# Patient Record
Sex: FEMALE | Race: WHITE | Employment: OTHER | URBAN - NONMETROPOLITAN AREA
[De-identification: names, ages, dates, MRNs, and addresses within clinical notes are randomized per-mention and may not be internally consistent; named-entity substitution may affect disease eponyms.]

---

## 2017-01-01 ENCOUNTER — OFFICE VISIT (OUTPATIENT)
Dept: NEPHROLOGY | Age: 82
End: 2017-01-01
Payer: MEDICARE

## 2017-01-01 VITALS
HEART RATE: 85 BPM | BODY MASS INDEX: 28.35 KG/M2 | WEIGHT: 160 LBS | DIASTOLIC BLOOD PRESSURE: 44 MMHG | OXYGEN SATURATION: 93 % | HEIGHT: 63 IN | RESPIRATION RATE: 18 BRPM | SYSTOLIC BLOOD PRESSURE: 94 MMHG

## 2017-01-01 DIAGNOSIS — N18.30 CKD (CHRONIC KIDNEY DISEASE), STAGE III (HCC): Primary | ICD-10-CM

## 2017-01-01 LAB
BASOPHILS ABSOLUTE: NORMAL /ΜL
BASOPHILS RELATIVE PERCENT: NORMAL %
BUN BLDV-MCNC: 36 MG/DL
CALCIUM SERPL-MCNC: 9.1 MG/DL
CHLORIDE BLD-SCNC: 102 MMOL/L
CO2: 25 MMOL/L
CREAT SERPL-MCNC: 1.5 MG/DL
EOSINOPHILS ABSOLUTE: NORMAL /ΜL
EOSINOPHILS RELATIVE PERCENT: NORMAL %
GFR CALCULATED: 35
GLUCOSE BLD-MCNC: 163 MG/DL
HCT VFR BLD CALC: 40.4 % (ref 36–46)
HEMOGLOBIN: 12.8 G/DL (ref 12–16)
LYMPHOCYTES ABSOLUTE: NORMAL /ΜL
LYMPHOCYTES RELATIVE PERCENT: NORMAL %
MCH RBC QN AUTO: NORMAL PG
MCHC RBC AUTO-ENTMCNC: NORMAL G/DL
MCV RBC AUTO: NORMAL FL
MONOCYTES ABSOLUTE: NORMAL /ΜL
MONOCYTES RELATIVE PERCENT: NORMAL %
NEUTROPHILS ABSOLUTE: NORMAL /ΜL
NEUTROPHILS RELATIVE PERCENT: NORMAL %
PLATELET # BLD: 213 K/ΜL
PMV BLD AUTO: NORMAL FL
POTASSIUM SERPL-SCNC: 5.1 MMOL/L
RBC # BLD: 4.36 10^6/ΜL
SODIUM BLD-SCNC: 138 MMOL/L
WBC # BLD: 6.64 10^3/ML

## 2017-01-01 PROCEDURE — G8419 CALC BMI OUT NRM PARAM NOF/U: HCPCS | Performed by: INTERNAL MEDICINE

## 2017-01-01 PROCEDURE — G8484 FLU IMMUNIZE NO ADMIN: HCPCS | Performed by: INTERNAL MEDICINE

## 2017-01-01 PROCEDURE — 4040F PNEUMOC VAC/ADMIN/RCVD: CPT | Performed by: INTERNAL MEDICINE

## 2017-01-01 PROCEDURE — 1036F TOBACCO NON-USER: CPT | Performed by: INTERNAL MEDICINE

## 2017-01-01 PROCEDURE — 1123F ACP DISCUSS/DSCN MKR DOCD: CPT | Performed by: INTERNAL MEDICINE

## 2017-01-01 PROCEDURE — G8427 DOCREV CUR MEDS BY ELIG CLIN: HCPCS | Performed by: INTERNAL MEDICINE

## 2017-01-01 PROCEDURE — 1090F PRES/ABSN URINE INCON ASSESS: CPT | Performed by: INTERNAL MEDICINE

## 2017-01-01 PROCEDURE — 99213 OFFICE O/P EST LOW 20 MIN: CPT | Performed by: INTERNAL MEDICINE

## 2017-01-01 PROCEDURE — G8598 ASA/ANTIPLAT THER USED: HCPCS | Performed by: INTERNAL MEDICINE

## 2017-01-01 RX ORDER — MOXIFLOXACIN HCL 0.5 %
DROPS OPHTHALMIC (EYE)
COMMUNITY
Start: 2017-11-01 | End: 2018-01-01 | Stop reason: ALTCHOICE

## 2017-01-20 ENCOUNTER — OFFICE VISIT (OUTPATIENT)
Dept: NEPHROLOGY | Age: 82
End: 2017-01-20

## 2017-01-20 VITALS
RESPIRATION RATE: 18 BRPM | HEIGHT: 64 IN | DIASTOLIC BLOOD PRESSURE: 64 MMHG | HEART RATE: 78 BPM | OXYGEN SATURATION: 92 % | WEIGHT: 155 LBS | SYSTOLIC BLOOD PRESSURE: 112 MMHG | BODY MASS INDEX: 26.46 KG/M2

## 2017-01-20 DIAGNOSIS — N18.30 CKD (CHRONIC KIDNEY DISEASE) STAGE 3, GFR 30-59 ML/MIN (HCC): Primary | ICD-10-CM

## 2017-01-20 PROCEDURE — 1123F ACP DISCUSS/DSCN MKR DOCD: CPT | Performed by: INTERNAL MEDICINE

## 2017-01-20 PROCEDURE — G8484 FLU IMMUNIZE NO ADMIN: HCPCS | Performed by: INTERNAL MEDICINE

## 2017-01-20 PROCEDURE — G8598 ASA/ANTIPLAT THER USED: HCPCS | Performed by: INTERNAL MEDICINE

## 2017-01-20 PROCEDURE — 99213 OFFICE O/P EST LOW 20 MIN: CPT | Performed by: INTERNAL MEDICINE

## 2017-01-20 PROCEDURE — G8427 DOCREV CUR MEDS BY ELIG CLIN: HCPCS | Performed by: INTERNAL MEDICINE

## 2017-01-20 PROCEDURE — 4040F PNEUMOC VAC/ADMIN/RCVD: CPT | Performed by: INTERNAL MEDICINE

## 2017-01-20 PROCEDURE — 1090F PRES/ABSN URINE INCON ASSESS: CPT | Performed by: INTERNAL MEDICINE

## 2017-01-20 PROCEDURE — G8420 CALC BMI NORM PARAMETERS: HCPCS | Performed by: INTERNAL MEDICINE

## 2017-01-20 PROCEDURE — 1036F TOBACCO NON-USER: CPT | Performed by: INTERNAL MEDICINE

## 2017-01-20 RX ORDER — LISINOPRIL 2.5 MG/1
2.5 TABLET ORAL DAILY
Qty: 90 TABLET | Refills: 3 | Status: SHIPPED | OUTPATIENT
Start: 2017-01-20 | End: 2018-01-01 | Stop reason: ALTCHOICE

## 2017-02-04 LAB
BASOPHILS ABSOLUTE: NORMAL /ΜL
BASOPHILS RELATIVE PERCENT: NORMAL %
BUN BLDV-MCNC: 34 MG/DL
CALCIUM SERPL-MCNC: 9.3 MG/DL
CHLORIDE BLD-SCNC: 104 MMOL/L
CO2: 23 MMOL/L
CREAT SERPL-MCNC: 1.3 MG/DL
EOSINOPHILS ABSOLUTE: NORMAL /ΜL
EOSINOPHILS RELATIVE PERCENT: NORMAL %
GFR CALCULATED: 41
GLUCOSE BLD-MCNC: 90 MG/DL
HCT VFR BLD CALC: 38.6 % (ref 36–46)
HEMOGLOBIN: 12.6 G/DL (ref 12–16)
LYMPHOCYTES ABSOLUTE: NORMAL /ΜL
LYMPHOCYTES RELATIVE PERCENT: NORMAL %
MCH RBC QN AUTO: NORMAL PG
MCHC RBC AUTO-ENTMCNC: NORMAL G/DL
MCV RBC AUTO: NORMAL FL
MONOCYTES ABSOLUTE: NORMAL /ΜL
MONOCYTES RELATIVE PERCENT: NORMAL %
NEUTROPHILS ABSOLUTE: NORMAL /ΜL
NEUTROPHILS RELATIVE PERCENT: NORMAL %
PLATELET # BLD: 184 K/ΜL
PMV BLD AUTO: NORMAL FL
POTASSIUM SERPL-SCNC: 4.1 MMOL/L
RBC # BLD: 4.31 10^6/ΜL
SODIUM BLD-SCNC: 138 MMOL/L
WBC # BLD: 6.57 10^3/ML

## 2017-02-17 ENCOUNTER — OFFICE VISIT (OUTPATIENT)
Dept: NEPHROLOGY | Age: 82
End: 2017-02-17

## 2017-02-17 VITALS
SYSTOLIC BLOOD PRESSURE: 98 MMHG | OXYGEN SATURATION: 93 % | RESPIRATION RATE: 18 BRPM | WEIGHT: 154 LBS | DIASTOLIC BLOOD PRESSURE: 58 MMHG | HEIGHT: 64 IN | BODY MASS INDEX: 26.29 KG/M2 | HEART RATE: 83 BPM

## 2017-02-17 DIAGNOSIS — N18.30 CKD (CHRONIC KIDNEY DISEASE) STAGE 3, GFR 30-59 ML/MIN (HCC): Primary | ICD-10-CM

## 2017-02-17 PROCEDURE — G8484 FLU IMMUNIZE NO ADMIN: HCPCS | Performed by: INTERNAL MEDICINE

## 2017-02-17 PROCEDURE — 99213 OFFICE O/P EST LOW 20 MIN: CPT | Performed by: INTERNAL MEDICINE

## 2017-02-17 PROCEDURE — 1036F TOBACCO NON-USER: CPT | Performed by: INTERNAL MEDICINE

## 2017-02-17 PROCEDURE — G8598 ASA/ANTIPLAT THER USED: HCPCS | Performed by: INTERNAL MEDICINE

## 2017-02-17 PROCEDURE — G8420 CALC BMI NORM PARAMETERS: HCPCS | Performed by: INTERNAL MEDICINE

## 2017-02-17 PROCEDURE — 1123F ACP DISCUSS/DSCN MKR DOCD: CPT | Performed by: INTERNAL MEDICINE

## 2017-02-17 PROCEDURE — G8427 DOCREV CUR MEDS BY ELIG CLIN: HCPCS | Performed by: INTERNAL MEDICINE

## 2017-02-17 PROCEDURE — 1090F PRES/ABSN URINE INCON ASSESS: CPT | Performed by: INTERNAL MEDICINE

## 2017-02-17 PROCEDURE — 4040F PNEUMOC VAC/ADMIN/RCVD: CPT | Performed by: INTERNAL MEDICINE

## 2017-08-12 LAB
BASOPHILS ABSOLUTE: NORMAL /ΜL
BASOPHILS RELATIVE PERCENT: NORMAL %
BUN BLDV-MCNC: 30 MG/DL
CALCIUM SERPL-MCNC: 8.9 MG/DL
CHLORIDE BLD-SCNC: 102 MMOL/L
CO2: 26 MMOL/L
CREAT SERPL-MCNC: 1.4 MG/DL
EOSINOPHILS ABSOLUTE: NORMAL /ΜL
EOSINOPHILS RELATIVE PERCENT: NORMAL %
GFR CALCULATED: 38
GLUCOSE BLD-MCNC: 200 MG/DL
HCT VFR BLD CALC: 38.2 % (ref 36–46)
HEMOGLOBIN: 12.6 G/DL (ref 12–16)
LYMPHOCYTES ABSOLUTE: NORMAL /ΜL
LYMPHOCYTES RELATIVE PERCENT: NORMAL %
MCH RBC QN AUTO: NORMAL PG
MCHC RBC AUTO-ENTMCNC: NORMAL G/DL
MCV RBC AUTO: NORMAL FL
MONOCYTES ABSOLUTE: NORMAL /ΜL
MONOCYTES RELATIVE PERCENT: NORMAL %
NEUTROPHILS ABSOLUTE: NORMAL /ΜL
NEUTROPHILS RELATIVE PERCENT: NORMAL %
PLATELET # BLD: 194 K/ΜL
PMV BLD AUTO: NORMAL FL
POTASSIUM SERPL-SCNC: 4.2 MMOL/L
RBC # BLD: 4.19 10^6/ΜL
SODIUM BLD-SCNC: 139 MMOL/L
WBC # BLD: 5.41 10^3/ML

## 2017-08-18 ENCOUNTER — OFFICE VISIT (OUTPATIENT)
Dept: NEPHROLOGY | Age: 82
End: 2017-08-18
Payer: MEDICARE

## 2017-08-18 VITALS
BODY MASS INDEX: 27.64 KG/M2 | DIASTOLIC BLOOD PRESSURE: 64 MMHG | RESPIRATION RATE: 18 BRPM | SYSTOLIC BLOOD PRESSURE: 128 MMHG | WEIGHT: 156 LBS | OXYGEN SATURATION: 90 % | HEIGHT: 63 IN | HEART RATE: 82 BPM

## 2017-08-18 DIAGNOSIS — N18.30 CKD (CHRONIC KIDNEY DISEASE), STAGE III (HCC): Primary | ICD-10-CM

## 2017-08-18 PROCEDURE — 1036F TOBACCO NON-USER: CPT | Performed by: INTERNAL MEDICINE

## 2017-08-18 PROCEDURE — G8419 CALC BMI OUT NRM PARAM NOF/U: HCPCS | Performed by: INTERNAL MEDICINE

## 2017-08-18 PROCEDURE — 99213 OFFICE O/P EST LOW 20 MIN: CPT | Performed by: INTERNAL MEDICINE

## 2017-08-18 PROCEDURE — 1123F ACP DISCUSS/DSCN MKR DOCD: CPT | Performed by: INTERNAL MEDICINE

## 2017-08-18 PROCEDURE — G8427 DOCREV CUR MEDS BY ELIG CLIN: HCPCS | Performed by: INTERNAL MEDICINE

## 2017-08-18 PROCEDURE — 1090F PRES/ABSN URINE INCON ASSESS: CPT | Performed by: INTERNAL MEDICINE

## 2017-08-18 PROCEDURE — G8598 ASA/ANTIPLAT THER USED: HCPCS | Performed by: INTERNAL MEDICINE

## 2017-08-18 PROCEDURE — 4040F PNEUMOC VAC/ADMIN/RCVD: CPT | Performed by: INTERNAL MEDICINE

## 2017-09-29 LAB
ALBUMIN SERPL-MCNC: 3.3 G/DL
ALP BLD-CCNC: 61 U/L
ALT SERPL-CCNC: 21 U/L
ANION GAP SERPL CALCULATED.3IONS-SCNC: 16 MMOL/L
AST SERPL-CCNC: 22 U/L
BILIRUB SERPL-MCNC: 0.7 MG/DL (ref 0.1–1.4)
BUN BLDV-MCNC: 51 MG/DL
CALCIUM SERPL-MCNC: 9.2 MG/DL
CHLORIDE BLD-SCNC: 104 MMOL/L
CHOLESTEROL, TOTAL: 157 MG/DL
CHOLESTEROL/HDL RATIO: ABNORMAL
CO2: 24 MMOL/L
CREAT SERPL-MCNC: 1.7 MG/DL
GFR CALCULATED: 30
GLUCOSE BLD-MCNC: 93 MG/DL
HDLC SERPL-MCNC: 30 MG/DL (ref 35–70)
LDL CHOLESTEROL CALCULATED: 71 MG/DL (ref 0–160)
POTASSIUM SERPL-SCNC: 4.7 MMOL/L
SODIUM BLD-SCNC: 139 MMOL/L
TOTAL PROTEIN: 7.5
TRIGL SERPL-MCNC: 282 MG/DL
VLDLC SERPL CALC-MCNC: 56 MG/DL

## 2017-10-09 ENCOUNTER — TELEPHONE (OUTPATIENT)
Dept: NEPHROLOGY | Age: 82
End: 2017-10-09

## 2017-10-09 NOTE — TELEPHONE ENCOUNTER
The labs from [facility] from 9-29 are in [facility]

## 2017-10-20 ENCOUNTER — OFFICE VISIT (OUTPATIENT)
Dept: NEPHROLOGY | Age: 82
End: 2017-10-20
Payer: MEDICARE

## 2017-10-20 VITALS
OXYGEN SATURATION: 95 % | SYSTOLIC BLOOD PRESSURE: 128 MMHG | DIASTOLIC BLOOD PRESSURE: 78 MMHG | HEIGHT: 64 IN | RESPIRATION RATE: 18 BRPM | HEART RATE: 71 BPM | BODY MASS INDEX: 27.31 KG/M2 | WEIGHT: 160 LBS

## 2017-10-20 DIAGNOSIS — N18.4 CKD (CHRONIC KIDNEY DISEASE), STAGE IV (HCC): Primary | ICD-10-CM

## 2017-10-20 PROCEDURE — 4040F PNEUMOC VAC/ADMIN/RCVD: CPT | Performed by: INTERNAL MEDICINE

## 2017-10-20 PROCEDURE — G8484 FLU IMMUNIZE NO ADMIN: HCPCS | Performed by: INTERNAL MEDICINE

## 2017-10-20 PROCEDURE — G8598 ASA/ANTIPLAT THER USED: HCPCS | Performed by: INTERNAL MEDICINE

## 2017-10-20 PROCEDURE — 99213 OFFICE O/P EST LOW 20 MIN: CPT | Performed by: INTERNAL MEDICINE

## 2017-10-20 PROCEDURE — 1123F ACP DISCUSS/DSCN MKR DOCD: CPT | Performed by: INTERNAL MEDICINE

## 2017-10-20 PROCEDURE — G8427 DOCREV CUR MEDS BY ELIG CLIN: HCPCS | Performed by: INTERNAL MEDICINE

## 2017-10-20 PROCEDURE — 1090F PRES/ABSN URINE INCON ASSESS: CPT | Performed by: INTERNAL MEDICINE

## 2017-10-20 PROCEDURE — G8419 CALC BMI OUT NRM PARAM NOF/U: HCPCS | Performed by: INTERNAL MEDICINE

## 2017-10-20 PROCEDURE — 1036F TOBACCO NON-USER: CPT | Performed by: INTERNAL MEDICINE

## 2017-10-20 NOTE — PROGRESS NOTES
[facility]    [address] [facility]  [address], [address]  [phone]       Progress Note    10/20/2017 11:32 AM    Pt Name:    Matteo Espinal  YOB: 1927  Primary Care Physician:  Adriana Christianson DO       Chief Complaint:   Chief Complaint   Patient presents with    Chronic Kidney Disease     iii        History of Chief Complaint: CKD stage IIIB from aging and HTN and DM     Subjective:  I last saw the patient in clinic 08/18/17. I follow the patient for Chronic Kidney disease stage IIIB. Since our last visit the patient has not been hospitalized. The patient is sleeping fairly well at night with 1-2 times per night nocturia. The patient has a good appetite and is remaining active. The patient denied N/V/C/D/SOB/CP. EGFR=30 Ml/Min   (it was 38 ml/min last night)       Objective:  VITALS:  /78 (Site: Left Arm, Position: Sitting, Cuff Size: Small Adult)   Pulse 71   Resp 18   Ht 5' 3.5\" (1.613 m)   Wt 160 lb (72.6 kg)   SpO2 95%   BMI 27.90 kg/m²   Weight:   Wt Readings from Last 3 Encounters:   10/20/17 160 lb (72.6 kg)   08/18/17 156 lb (70.8 kg)   02/17/17 154 lb (69.9 kg)     Body mass index is 27.9 kg/m². Physical examination    General:  Alert and cooperative with exam  HEENT:  Head: Normocephalic, no lesions, without obvious abnormality. Neck:   No JVD and no bruits. Thyroid gland is normal  Lungs:  clear to auscultation bilaterally  Heart:  regular rate and rhythm, S1, S2 normal, no murmur, click, rub or gallop  Abdomen:  soft, non-tender; bowel sounds normal; no masses,  no organomegaly  Extremities:  extremities normal, atraumatic, no cyanosis or edema  Neurologic:  Mental status: Alert, oriented, thought content appropriate  Skin:                Warm and dry with no rashes. Muscles:         Hand  and leg strength are equal and strong bilaterally.      Lab Data      CBC:   Lab Results   Component Value Date    WBC 5.41 08/12/2017 HGB 12.6 08/12/2017    HCT 38.2 08/12/2017    MCV 76.6 (L) 01/21/2016     08/12/2017     BMP:    Lab Results   Component Value Date     09/29/2017     08/12/2017     02/04/2017    K 4.7 09/29/2017    K 4.2 08/12/2017    K 4.1 02/04/2017     09/29/2017     08/12/2017     02/04/2017    CO2 24 09/29/2017    CO2 26 08/12/2017    CO2 23 02/04/2017    BUN 51 09/29/2017    BUN 30 08/12/2017    BUN 34 02/04/2017    CREATININE 1.7 09/29/2017    CREATININE 1.4 08/12/2017    CREATININE 1.3 02/04/2017    GLUCOSE 93 09/29/2017    GLUCOSE 200 08/12/2017    GLUCOSE 90 02/04/2017      Hepatic:   Lab Results   Component Value Date    AST 22 09/29/2017    AST 14 01/20/2016    AST 14 (L) 02/26/2015    ALT 21 09/29/2017    ALT 8 (L) 01/20/2016    ALT 10 02/26/2015    BILITOT 0.7 09/29/2017    BILITOT 0.4 01/20/2016    BILITOT 0.6 02/26/2015    ALKPHOS 61 09/29/2017    ALKPHOS 63 01/20/2016    ALKPHOS 71 02/26/2015     BNP:   Lab Results   Component Value Date     (H) 04/06/2013     (H) 04/05/2013    BNP 1,576 (H) 04/04/2013     Lipids:   Lab Results   Component Value Date    CHOL 157 09/29/2017    HDL 30 (A) 09/29/2017     INR:   Lab Results   Component Value Date    INR 1.10 01/06/2015    INR 0.89 05/06/2013    INR 1.02 04/04/2013     URINE:   Lab Results   Component Value Date    NAUR 148 02/27/2015     Lab Results   Component Value Date    NITRU NEGATIVE 01/20/2016    COLORU YELLOW 01/20/2016    PHUR 6.5 01/20/2016    WBCUA 0-2 01/20/2016    RBCUA 0-2 01/20/2016    MUCUS NEGATIVE 02/26/2015    YEAST NONE SEEN 01/20/2016    BACTERIA NONE 01/20/2016    CLARITYU HAZY 02/26/2015    SPECGRAV 1.017 02/26/2015    LEUKOCYTESUR NEGATIVE 01/20/2016    UROBILINOGEN 0.2 01/20/2016    BILIRUBINUR NEGATIVE 01/20/2016    BLOODU NEGATIVE 01/20/2016    GLUCOSEU >= 1000 01/20/2016    KETUA NEGATIVE 01/20/2016      Microalbumen/Creatinine ratio:  No components found for: RUCREAT Medications:    Current Outpatient Prescriptions   Medication Sig Dispense Refill    furosemide (LASIX) 20 MG tablet Take 10 mg by mouth daily      Omega-3 Fatty Acids (OMEGA 3 PO) Take by mouth 4 times daily      lisinopril (ZESTRIL) 2.5 MG tablet Take 1 tablet by mouth daily 90 tablet 3    insulin aspart (NOVOLOG) 100 UNIT/ML injection vial Inject 12 Units into the skin 3 times daily (before meals) (Patient taking differently: Inject 24 Units into the skin 3 times daily (before meals) ) 1 vial 3    glucose blood VI test strips (ASCENSIA AUTODISC VI;ONE TOUCH ULTRA TEST VI) strip Check blood sugar 4 x daily (Dx: E11.65) 150 each 3    Lancets MISC Check blood sugar 4 x daily (Dx: E11.65) 150 each 3    insulin glargine (LANTUS) 100 UNIT/ML injection vial Inject 35 Units into the skin every morning (Patient taking differently: Inject 48 Units into the skin every morning ) 1 vial 3    gabapentin (NEURONTIN) 100 MG capsule Take 200 mg by mouth 2 times daily       spironolactone (ALDACTONE) 25 MG tablet Take 25 mg by mouth daily      simvastatin (ZOCOR) 40 MG tablet TAKE 1 TABLET NIGHTLY 90 tablet 3    DIGOX 0.125 MG tablet TAKE 1 TABLET DAILY 90 tablet 3    carvedilol (COREG) 3.125 MG tablet Take 1 tablet by mouth 2 times daily (with meals). 180 tablet 3    magnesium oxide (MAG-OX) 400 MG tablet Take 1 tablet by mouth 2 times daily. 30 tablet 0     No current facility-administered medications for this visit.             IMPRESSION:    Patient Active Problem List   Diagnosis Code    CHF (congestive heart failure) I50.9    Pneumonia J18.9    CAD (coronary artery disease) I25.10    Post PTCA Z98.61    Diabetes mellitus Type 2 E11.9    Dysphagia R13.10    Hyperlipidemia E78.5    DKA (diabetic ketoacidoses) ([facility]) E13.10    Rhabdomyolysis M62.82    DYLLAN (acute kidney injury) ([facility] [address] N17.9    Hydronephrosis, left N13.30    Severe sepsis ([facility]) A41.9, R65.20    Lactic acidosis E87.2    Volume depletion

## 2017-12-08 NOTE — PROGRESS NOTES
Patient brought a list of medications with her today.
11/27/2017    HGB 12.8 11/27/2017    HCT 40.4 11/27/2017    MCV 76.6 (L) 01/21/2016     11/27/2017     BMP:    Lab Results   Component Value Date     11/27/2017     09/29/2017     08/12/2017    K 5.1 11/27/2017    K 4.7 09/29/2017    K 4.2 08/12/2017     11/27/2017     09/29/2017     08/12/2017    CO2 25 11/27/2017    CO2 24 09/29/2017    CO2 26 08/12/2017    BUN 36 11/27/2017    BUN 51 09/29/2017    BUN 30 08/12/2017    CREATININE 1.5 11/27/2017    CREATININE 1.7 09/29/2017    CREATININE 1.4 08/12/2017    GLUCOSE 163 11/27/2017    GLUCOSE 93 09/29/2017    GLUCOSE 200 08/12/2017      Hepatic:   Lab Results   Component Value Date    AST 22 09/29/2017    AST 14 01/20/2016    AST 14 (L) 02/26/2015    ALT 21 09/29/2017    ALT 8 (L) 01/20/2016    ALT 10 02/26/2015    BILITOT 0.7 09/29/2017    BILITOT 0.4 01/20/2016    BILITOT 0.6 02/26/2015    ALKPHOS 61 09/29/2017    ALKPHOS 63 01/20/2016    ALKPHOS 71 02/26/2015     BNP:   Lab Results   Component Value Date     (H) 04/06/2013     (H) 04/05/2013    BNP 1,576 (H) 04/04/2013     Lipids:   Lab Results   Component Value Date    CHOL 157 09/29/2017    HDL 30 (A) 09/29/2017     INR:   Lab Results   Component Value Date    INR 1.10 01/06/2015    INR 0.89 05/06/2013    INR 1.02 04/04/2013     URINE:   Lab Results   Component Value Date    NAUR 148 02/27/2015     Lab Results   Component Value Date    NITRU NEGATIVE 01/20/2016    COLORU YELLOW 01/20/2016    PHUR 6.5 01/20/2016    WBCUA 0-2 01/20/2016    RBCUA 0-2 01/20/2016    MUCUS NEGATIVE 02/26/2015    YEAST NONE SEEN 01/20/2016    BACTERIA NONE 01/20/2016    CLARITYU HAZY 02/26/2015    SPECGRAV 1.017 02/26/2015    LEUKOCYTESUR NEGATIVE 01/20/2016    UROBILINOGEN 0.2 01/20/2016    BILIRUBINUR NEGATIVE 01/20/2016    BLOODU NEGATIVE 01/20/2016    GLUCOSEU >= 1000 01/20/2016    KETUA NEGATIVE 01/20/2016      Microalbumen/Creatinine ratio:  No components found for: RUCREAT

## 2018-01-01 ENCOUNTER — OFFICE VISIT (OUTPATIENT)
Dept: NEPHROLOGY | Age: 83
End: 2018-01-01
Payer: MEDICARE

## 2018-01-01 VITALS
OXYGEN SATURATION: 95 % | HEART RATE: 73 BPM | WEIGHT: 156 LBS | DIASTOLIC BLOOD PRESSURE: 68 MMHG | RESPIRATION RATE: 18 BRPM | SYSTOLIC BLOOD PRESSURE: 118 MMHG | HEIGHT: 64 IN | BODY MASS INDEX: 26.63 KG/M2

## 2018-01-01 VITALS
SYSTOLIC BLOOD PRESSURE: 130 MMHG | DIASTOLIC BLOOD PRESSURE: 64 MMHG | HEIGHT: 64 IN | RESPIRATION RATE: 14 BRPM | WEIGHT: 158.4 LBS | HEART RATE: 80 BPM | BODY MASS INDEX: 27.04 KG/M2

## 2018-01-01 DIAGNOSIS — N18.30 ANEMIA IN STAGE 3 CHRONIC KIDNEY DISEASE (HCC): Primary | ICD-10-CM

## 2018-01-01 DIAGNOSIS — D63.1 ANEMIA IN STAGE 3 CHRONIC KIDNEY DISEASE (HCC): Primary | ICD-10-CM

## 2018-01-01 DIAGNOSIS — N18.30 CKD (CHRONIC KIDNEY DISEASE), STAGE III (HCC): Primary | ICD-10-CM

## 2018-01-01 LAB
BASOPHILS ABSOLUTE: NORMAL /ΜL
BASOPHILS ABSOLUTE: NORMAL /ΜL
BASOPHILS RELATIVE PERCENT: NORMAL %
BASOPHILS RELATIVE PERCENT: NORMAL %
BUN BLDV-MCNC: 26 MG/DL
BUN BLDV-MCNC: 40 MG/DL
CALCIUM SERPL-MCNC: 8.9 MG/DL
CALCIUM SERPL-MCNC: 9.4 MG/DL
CHLORIDE BLD-SCNC: 104 MMOL/L
CHLORIDE BLD-SCNC: 105 MMOL/L
CO2: 27 MMOL/L
CO2: 27 MMOL/L
CREAT SERPL-MCNC: 1.3 MG/DL
CREAT SERPL-MCNC: 1.5 MG/DL
EOSINOPHILS ABSOLUTE: NORMAL /ΜL
EOSINOPHILS ABSOLUTE: NORMAL /ΜL
EOSINOPHILS RELATIVE PERCENT: NORMAL %
EOSINOPHILS RELATIVE PERCENT: NORMAL %
GFR CALCULATED: 35
GFR CALCULATED: 41
GLUCOSE BLD-MCNC: 140 MG/DL
GLUCOSE BLD-MCNC: 86 MG/DL
HCT VFR BLD CALC: 39.2 % (ref 36–46)
HCT VFR BLD CALC: 40.2 % (ref 36–46)
HEMOGLOBIN: 13 G/DL (ref 12–16)
HEMOGLOBIN: 13.1 G/DL (ref 12–16)
LYMPHOCYTES ABSOLUTE: NORMAL /ΜL
LYMPHOCYTES ABSOLUTE: NORMAL /ΜL
LYMPHOCYTES RELATIVE PERCENT: NORMAL %
LYMPHOCYTES RELATIVE PERCENT: NORMAL %
MCH RBC QN AUTO: NORMAL PG
MCH RBC QN AUTO: NORMAL PG
MCHC RBC AUTO-ENTMCNC: NORMAL G/DL
MCHC RBC AUTO-ENTMCNC: NORMAL G/DL
MCV RBC AUTO: NORMAL FL
MCV RBC AUTO: NORMAL FL
MONOCYTES ABSOLUTE: NORMAL /ΜL
MONOCYTES ABSOLUTE: NORMAL /ΜL
MONOCYTES RELATIVE PERCENT: NORMAL %
MONOCYTES RELATIVE PERCENT: NORMAL %
NEUTROPHILS ABSOLUTE: NORMAL /ΜL
NEUTROPHILS ABSOLUTE: NORMAL /ΜL
NEUTROPHILS RELATIVE PERCENT: NORMAL %
NEUTROPHILS RELATIVE PERCENT: NORMAL %
PLATELET # BLD: 205 K/ΜL
PLATELET # BLD: 226 K/ΜL
PMV BLD AUTO: NORMAL FL
PMV BLD AUTO: NORMAL FL
POTASSIUM SERPL-SCNC: 3.9 MMOL/L
POTASSIUM SERPL-SCNC: 4.2 MMOL/L
RBC # BLD: 4.37 10^6/ΜL
RBC # BLD: 4.39 10^6/ΜL
SODIUM BLD-SCNC: 139 MMOL/L
SODIUM BLD-SCNC: 141 MMOL/L
WBC # BLD: 7.01 10^3/ML
WBC # BLD: 7.02 10^3/ML

## 2018-01-01 PROCEDURE — 1036F TOBACCO NON-USER: CPT | Performed by: INTERNAL MEDICINE

## 2018-01-01 PROCEDURE — G8419 CALC BMI OUT NRM PARAM NOF/U: HCPCS | Performed by: INTERNAL MEDICINE

## 2018-01-01 PROCEDURE — 1123F ACP DISCUSS/DSCN MKR DOCD: CPT | Performed by: INTERNAL MEDICINE

## 2018-01-01 PROCEDURE — 4040F PNEUMOC VAC/ADMIN/RCVD: CPT | Performed by: INTERNAL MEDICINE

## 2018-01-01 PROCEDURE — G8427 DOCREV CUR MEDS BY ELIG CLIN: HCPCS | Performed by: INTERNAL MEDICINE

## 2018-01-01 PROCEDURE — G8484 FLU IMMUNIZE NO ADMIN: HCPCS | Performed by: INTERNAL MEDICINE

## 2018-01-01 PROCEDURE — 99213 OFFICE O/P EST LOW 20 MIN: CPT | Performed by: INTERNAL MEDICINE

## 2018-01-01 PROCEDURE — 1101F PT FALLS ASSESS-DOCD LE1/YR: CPT | Performed by: INTERNAL MEDICINE

## 2018-01-01 PROCEDURE — G8599 NO ASA/ANTIPLAT THER USE RNG: HCPCS | Performed by: INTERNAL MEDICINE

## 2018-01-01 PROCEDURE — 1090F PRES/ABSN URINE INCON ASSESS: CPT | Performed by: INTERNAL MEDICINE

## 2018-01-01 RX ORDER — INSULIN DEGLUDEC INJECTION 100 U/ML
48 INJECTION, SOLUTION SUBCUTANEOUS DAILY
Refills: 3 | COMMUNITY
Start: 2018-01-01